# Patient Record
(demographics unavailable — no encounter records)

---

## 2024-11-14 NOTE — PHYSICAL EXAM
[Right] : right knee [NL (0)] : extension 0 degrees [5___] : hamstring 5[unfilled]/5 [Equivocal] : equivocal [place] [AP] : anteroposterior [Lateral] : lateral [[place]] : skyline [There are no fractures, subluxations or dislocations. No significant abnormalities are seen] : There are no fractures, subluxations or dislocations. No significant abnormalities are seen [Degenerative change] : Degenerative change [Moderate tricompartmental OA medial narrowing] : Moderate tricompartmental OA medial narrowing [] : no guarding during exam [FreeTextEntry9] : maltracking of patella better [TWNoteComboBox7] : flexion 120 degrees

## 2024-11-14 NOTE — HISTORY OF PRESENT ILLNESS
[de-identified] : Patient is following up on RT knee pain. Patient had a cortisone injection last on 9/27/24 and lasted him until 2 weeks ago. Patient takes ibuprofen prn. Patient stopped going to PT 3 weeks ago because it was painful to continue.

## 2024-11-14 NOTE — DISCUSSION/SUMMARY
[de-identified] : I reviewed all diagnostic and physical findings, the anatomy and pathology were discussed and the patient understands. All questions were answered and the patient left pleased..nonop After discussion of diagnosis and treatment options, this patient has elected to treat non-operatively with exercises, medications, physical therapy and activity modification. Patient would benefit from a course/subsequent course of viscosupplementation injections.

## 2024-12-02 NOTE — DISCUSSION/SUMMARY
[de-identified] : I reviewed all diagnostic and physical findings, the anatomy and pathology were discussed and the patient understands. All questions were answered and the patient left pleased. After discussion of diagnosis and treatment options, this patient has elected to treat non-operatively with exercises, medications, physical therapy and activity modification. Patient would benefit from a course/subsequent course of viscosupplementation injections.

## 2024-12-02 NOTE — DATA REVIEWED
[MRI] : MRI [Right] : of the right [Knee] : knee [Report was reviewed and noted in the chart] : The report was reviewed and noted in the chart [I reviewed the films/CD and agree] : I reviewed the films/CD and agree [FreeTextEntry1] : Complex tear of the posterior horn of the medial meniscus. Sprains of ACL, PCL and LCL. Arthritis, suprapatellar joint effusion with Baker's cyst.

## 2024-12-02 NOTE — PHYSICAL EXAM
[Right] : right knee [NL (0)] : extension 0 degrees [5___] : hamstring 5[unfilled]/5 [Equivocal] : equivocal Kodi [AP] : anteroposterior [Lateral] : lateral [[place]] : skyline [There are no fractures, subluxations or dislocations. No significant abnormalities are seen] : There are no fractures, subluxations or dislocations. No significant abnormalities are seen [Degenerative change] : Degenerative change [Moderate tricompartmental OA medial narrowing] : Moderate tricompartmental OA medial narrowing [FreeTextEntry9] : maltracking of patella better [] : no guarding during exam [TWNoteComboBox7] : flexion 120 degrees

## 2024-12-09 NOTE — PROCEDURE
[Large Joint Injection] : Large joint injection [Right] : of the right [Knee] : knee [Pain] : pain [Inflammation] : inflammation [Alcohol] : alcohol [Betadine] : betadine [Ethyl Chloride sprayed topically] : ethyl chloride sprayed topically [Sterile technique used] : sterile technique used [Gel-Syn (16.8mg)] : 16.8mg of Gel-Syn [#1] : series #1 [] : Patient tolerated procedure well [Call if redness, pain or fever occur] : call if redness, pain or fever occur [Apply ice for 15min out of every hour for the next 12-24 hours as tolerated] : apply ice for 15 minutes out of every hour for the next 12-24 hours as tolerated [Patient was advised to rest the joint(s) for ____ days] : patient was advised to rest the joint(s) for [unfilled] days [Previous OTC use and PT nontherapeutic] : patient has tried OTC's including aspirin, Ibuprofen, Aleve, etc or prescription NSAIDS, and/or exercises at home and/or physical therapy without satisfactory response [Patient had decreased mobility in the joint] : patient had decreased mobility in the joint [Risks, benefits, alternatives discussed / Verbal consent obtained] : the risks benefits, and alternatives have been discussed, and verbal consent was obtained

## 2024-12-16 NOTE — PROCEDURE
[Large Joint Injection] : Large joint injection [Right] : of the right [Knee] : knee [Pain] : pain [Inflammation] : inflammation [Alcohol] : alcohol [Betadine] : betadine [Ethyl Chloride sprayed topically] : ethyl chloride sprayed topically [Sterile technique used] : sterile technique used [Gel-Syn (16.8mg)] : 16.8mg of Gel-Syn [] : Patient tolerated procedure well [#2] : series #2 [Call if redness, pain or fever occur] : call if redness, pain or fever occur [Apply ice for 15min out of every hour for the next 12-24 hours as tolerated] : apply ice for 15 minutes out of every hour for the next 12-24 hours as tolerated [Patient was advised to rest the joint(s) for ____ days] : patient was advised to rest the joint(s) for [unfilled] days [Previous OTC use and PT nontherapeutic] : patient has tried OTC's including aspirin, Ibuprofen, Aleve, etc or prescription NSAIDS, and/or exercises at home and/or physical therapy without satisfactory response [Patient had decreased mobility in the joint] : patient had decreased mobility in the joint [Risks, benefits, alternatives discussed / Verbal consent obtained] : the risks benefits, and alternatives have been discussed, and verbal consent was obtained

## 2024-12-23 NOTE — PROCEDURE
[Large Joint Injection] : Large joint injection [Right] : of the right [Knee] : knee [Pain] : pain [Inflammation] : inflammation [Alcohol] : alcohol [Betadine] : betadine [Ethyl Chloride sprayed topically] : ethyl chloride sprayed topically [Sterile technique used] : sterile technique used [Gel-Syn (16.8mg)] : 16.8mg of Gel-Syn [#3] : series #3 [] : Patient tolerated procedure well [Call if redness, pain or fever occur] : call if redness, pain or fever occur [Apply ice for 15min out of every hour for the next 12-24 hours as tolerated] : apply ice for 15 minutes out of every hour for the next 12-24 hours as tolerated [Patient was advised to rest the joint(s) for ____ days] : patient was advised to rest the joint(s) for [unfilled] days [Previous OTC use and PT nontherapeutic] : patient has tried OTC's including aspirin, Ibuprofen, Aleve, etc or prescription NSAIDS, and/or exercises at home and/or physical therapy without satisfactory response [Patient had decreased mobility in the joint] : patient had decreased mobility in the joint [Risks, benefits, alternatives discussed / Verbal consent obtained] : the risks benefits, and alternatives have been discussed, and verbal consent was obtained

## 2025-01-02 NOTE — DATA REVIEWED
[MRI] : MRI [Right] : of the right [Knee] : knee [Report was reviewed and noted in the chart] : The report was reviewed and noted in the chart [FreeTextEntry1] : Complex tear of the posterior horn of the medial meniscus. Sprains of ACL, PCL and LCL. Arthritis, suprapatellar joint effusion with Baker's cyst.

## 2025-01-02 NOTE — PHYSICAL EXAM
[de-identified] : Constitutional: The patient appears well developed, well nourished. Examination of patients ability to communicate functionally was normal.       Neurologic: Coordination is normal. Alert and oriented to time, place and person. No evidence of mood disorder, calm affect.        RIGHT    KNEE: Inspection of the knee is as follows: mild effusion. no ecchymosis, no streaking, no erythema, no atrophy, no deformities of the quad tendon and no deformities of patellar tendon.       Palpation of the knee is as follows: medial joint line tenderness. no obvious defects, no palpable masses, no increased warmth and no crepitus.       Knee Range of Motion is as follows in degrees:       Extension: 0    Flexion: 125      Strength examination of the knee is as follows:       Quadriceps strength is 5/5   Hamstring strength is 5/5       Ligament Stability and Special Test:  positive McMurrays test. ligamentously stable, negative anterior draw, negative Lachman test, negative posterior draw and no varus or valgus instability. patella tracks well and able to do active straight leg raise without an extensor lag.       Neurological examination of the knee is as follows: light touch is intact throughout.       Gait and function is as follows: non-antalgic gait.  [Right] : right knee [Lateral] : lateral [[place]] : skyline [AP Standing] : anteroposterior standing [FreeTextEntry9] : Medial joint space loss, greater than 50% loss, with osteophyte formation tricompartmental

## 2025-01-02 NOTE — DISCUSSION/SUMMARY
[de-identified] :  BUD BARNES 66 year M was seen and evaluated in office today. Following evaluation, the natural history of the pathology was explained in full to the patient in layman's terms.   Several different treatment options were discussed and explained in full to the patient, along with specific risks and benefits of both surgical and non-surgical treatments. Nonsurgical options including but not limited to Corticosteroid Injection, Visco supplementation, Prescription anti-inflammatory medications (both steroidal and non-steroidal), activity modification, non-impact exercise, maintaining a healthy BMI, bracing, and icing were all reviewed. Surgical options including but not limited to arthroscopy, and joint replacement along with other indicated procedures were discussed.   Discussed risk of morbidity associated with proposed treatment plans. These risks include, but are not limited to, the risk associated with prescription strength medications and possible side effects of these medications which include GI hemorrhage with oral medications along with cardiac/renal issues with long term use. Risks with all pertinent surgical interventions as discussed with patient including infection, bleeding, anesthesia complications, NV injury, fracture, DVT, or heterotopic ossification.   Furthermore, discussed with BUD that they could also delay any immediate treatment options and continue to observe and self-care for the discussed problem. Discussed Home Exercise Programs as well as Rest, Ice and elevation. Patient had ample time to ask any questions about todays visit and the diagnosis, and all questions were answered. Patient understands the plan going forward.   Lastly, based on this patient's presentation at our office, which is an orthopedic specialist office, this patient inherently / intrinsically has a risk of progression of symptoms/condition, as well as development and/or exacerbation of cardiovascular disease/conditions, obesity, DVT, worsening and chronic pain, and permanent loss of function, as well as decreased abilities to complete activities of daily life.  PLAN:   I have personally and independently reviewed all imaging reports. The findings and impressions were thoroughly discussed and reviewed with the patient as well. All questions have been answered and the patient is in agreement with the plan proceeding. Discussed with patient that on subsequent follow up they should bring the disc for further review.   The Risks, benefits, alternatives and expectations of the proposed procedure, as well as non-operative management, were discussed at length with the patient, as well as the procedure itself and the expected recovery period. The possible need for post operative Physical Therapy was also discussed. The patient was allowed as much time as necessary to ask all appropriate questions and they were answered to the patient's satisfaction.   Patient reports that he had RT KNEE ARTHROSCOPY 25 years ago  At this time discussed TKA vs Arthroscopy medial meniscectomy. Reviewed both with patient and patient's wife. At this time discussed with patient that currently RT TKA is likely premature, and that the patient would benefit from arthroscopy before considering TKA. Patient, after discussion of options, would like to proceed with RIGHT KNEE ARTHROSCOPY MEDIAL MENISCECTOMY. Patient has had no relief from HA series, PT or HEP.   Entered by Kyle Lynn acting as scribe. Dr. Ragsdale Attestation The documentation recorded by the scribe, in my presence, accurately reflects the service I, Dr. Ragsdale, personally performed, and the decisions made by me with my edits as appropriate.

## 2025-01-02 NOTE — HISTORY OF PRESENT ILLNESS
[de-identified] : 01/02/2025  UBD BARNES 66 year y/o M presents today for right knee pain. Patient reports a constant aching pain in the anterior right knee. Patient reports difficulty walking prolonged distances and stairs. Yes, by Dr. Mccormack and Dr. Wang. Patient reports he had R knee CSI on 05/18/22, 02/03/23, 06/09/23, 09/17/24 with minimal relief. Patient reports he is in physical therapy for his R knee.    Date of Onset: 3 years Mechanism of injury: NKI   Pain:    At Rest: 3/10    With Activity: 6/10   Have you been treated for this in the past? Yes, by Dr. Mccormack and Dr. Wang. Patient reports he had R knee CSI on 05/18/22, 02/03/23, 06/09/23, 09/17/24 with minimal relief. Patient reports he is in physical therapy for his R knee.  OTC/Rx Medication: Patient reports taking Tylenol PRN with minimal relief.  Heat, Ice, Elevation: Ice with mild relief. Previous Imaging/Studies: XR of R knee from 09/2024 and [facility] (does not have images with him)

## 2025-03-11 NOTE — DISCUSSION/SUMMARY
[de-identified] : I reviewed all diagnostic and physical findings, the anatomy and pathology were discussed and the patient understands. All questions were answered and the patient left pleased. Pt states this was result of incident at work,and has progress since then,  advise to get incident report from HR, and was this a WC injury

## 2025-03-11 NOTE — PHYSICAL EXAM
[Right] : right knee [NL (0)] : extension 0 degrees [5___] : hamstring 5[unfilled]/5 [Equivocal] : equivocal [place] [] : no guarding during exam [FreeTextEntry3] : brace [TWNoteComboBox7] : flexion 120 degrees

## 2025-03-11 NOTE — HISTORY OF PRESENT ILLNESS
[de-identified] : Patient is following up on RT knee pain. Patient saw Dr. Ragsdale in January to discuss TKA vs scope and talked about doing a knee scope before replacement. Patient states that he is no ready for surgery. Patient states that he did well with gel injections but not where he wants to be. Patient is still in PT.